# Patient Record
Sex: MALE | Race: WHITE | NOT HISPANIC OR LATINO | Employment: FULL TIME | URBAN - NONMETROPOLITAN AREA
[De-identification: names, ages, dates, MRNs, and addresses within clinical notes are randomized per-mention and may not be internally consistent; named-entity substitution may affect disease eponyms.]

---

## 2017-04-26 ENCOUNTER — HOSPITAL ENCOUNTER (OUTPATIENT)
Dept: RADIOLOGY | Facility: CLINIC | Age: 22
Discharge: HOME | End: 2017-04-26
Admitting: FAMILY MEDICINE
Payer: COMMERCIAL

## 2017-04-26 ENCOUNTER — OFFICE VISIT (OUTPATIENT)
Dept: URGENT CARE | Facility: CLINIC | Age: 22
End: 2017-04-26
Payer: COMMERCIAL

## 2017-04-26 DIAGNOSIS — M25.512 PAIN IN LEFT SHOULDER: ICD-10-CM

## 2017-04-26 PROCEDURE — 73030 X-RAY EXAM OF SHOULDER: CPT

## 2017-04-26 PROCEDURE — G0382 LEV 3 HOSP TYPE B ED VISIT: HCPCS

## 2017-04-26 PROCEDURE — 99283 EMERGENCY DEPT VISIT LOW MDM: CPT

## 2018-05-17 ENCOUNTER — OFFICE VISIT (OUTPATIENT)
Dept: URGENT CARE | Facility: CLINIC | Age: 23
End: 2018-05-17
Payer: COMMERCIAL

## 2018-05-17 VITALS
HEIGHT: 71 IN | DIASTOLIC BLOOD PRESSURE: 72 MMHG | WEIGHT: 230 LBS | OXYGEN SATURATION: 97 % | BODY MASS INDEX: 32.2 KG/M2 | HEART RATE: 73 BPM | TEMPERATURE: 98.6 F | SYSTOLIC BLOOD PRESSURE: 148 MMHG

## 2018-05-17 DIAGNOSIS — S46.912A STRAIN OF LEFT SHOULDER, INITIAL ENCOUNTER: Primary | ICD-10-CM

## 2018-05-17 PROCEDURE — 99213 OFFICE O/P EST LOW 20 MIN: CPT | Performed by: PHYSICIAN ASSISTANT

## 2018-05-17 RX ORDER — NAPROXEN 500 MG/1
500 TABLET ORAL 2 TIMES DAILY WITH MEALS
Qty: 14 TABLET | Refills: 0 | Status: SHIPPED | OUTPATIENT
Start: 2018-05-17 | End: 2021-08-08

## 2018-05-17 RX ORDER — BACLOFEN 10 MG/1
TABLET ORAL
Qty: 14 TABLET | Refills: 0 | Status: SHIPPED | OUTPATIENT
Start: 2018-05-17 | End: 2019-02-18

## 2018-05-17 NOTE — PROGRESS NOTES
[address] [employer] [address] [facility] [place]  (office) [phone]  (fax) [phone]        NAME: Julieth Fabian is a 20 y o  male  : 1995    MRN: [id number]  DATE: May 17, 2018  TIME: 3:02 PM    Assessment and Plan   Strain of left shoulder, initial encounter [L30 640A]  1  Strain of left shoulder, initial encounter  naproxen (NAPROSYN) 500 mg tablet    baclofen 10 mg tablet       Patient Instructions   To take medication as prescribed with food  Not to take baclofen while driving  Discussed with patient  To follow up with PCP or ortho is 3-5 days if no improvement in symptoms  To present to the ER if symptoms worsen  Chief Complaint     Chief Complaint   Patient presents with    Back Pain     SHOULDER BLADE 2 DAYS    Shoulder Pain         History of Present Illness   Julieth Fabian presents to the clinic c/o    Patient left handed  Shoulder Pain    The pain is present in the left shoulder  This is a recurrent problem  The current episode started in the past 7 days  There has been no history of extremity trauma  The problem occurs constantly  The problem has been unchanged  The quality of the pain is described as aching  The pain is at a severity of 6/10  The pain is moderate  Associated symptoms include numbness (reports occasional numbness from left elbow to fingers)  Pertinent negatives include no fever, inability to bear weight, itching, joint locking, joint swelling, limited range of motion, stiffness or tingling  The symptoms are aggravated by activity  He has tried nothing for the symptoms  The treatment provided no relief  Review of Systems   Review of Systems   Constitutional: Negative for activity change, appetite change, chills, diaphoresis, fatigue and fever  HENT: Negative for congestion, ear discharge, ear pain, facial swelling, rhinorrhea, sinus pain, sinus pressure, sneezing and sore throat      Eyes: Negative for photophobia, pain, discharge, redness, itching and visual disturbance  Respiratory: Negative for apnea, cough, chest tightness, shortness of breath and wheezing  Cardiovascular: Negative for chest pain  Gastrointestinal: Negative for abdominal distention, abdominal pain, anal bleeding, blood in stool, constipation, diarrhea, nausea and vomiting  Genitourinary: Negative for dysuria, flank pain, frequency, hematuria and urgency  Musculoskeletal: Positive for arthralgias  Negative for back pain, gait problem, joint swelling, myalgias, neck pain, neck stiffness and stiffness  Skin: Negative for color change, itching, rash and wound  Allergic/Immunologic: Negative for immunocompromised state  Neurological: Positive for numbness (reports occasional numbness from left elbow to fingers)  Negative for dizziness, tingling, facial asymmetry and headaches  Hematological: Negative for adenopathy  Psychiatric/Behavioral: Negative for confusion and decreased concentration  Current Medications     Long-Term Prescriptions   Medication Sig Dispense Refill    baclofen 10 mg tablet Take 1/2-1 tablet bid prn NOT to take while driving 14 tablet 0    naproxen (NAPROSYN) 500 mg tablet Take 1 tablet (500 mg total) by mouth 2 (two) times a day with meals for 7 days 14 tablet 0       Current Allergies     Allergies as of 05/17/2018    (No Known Allergies)            The following portions of the patient's history were reviewed and updated as appropriate: allergies, current medications, past family history, past medical history, past social history, past surgical history and problem list   History reviewed  No pertinent past medical history  History reviewed  No pertinent surgical history  Social History     Social History    Marital status: Single     Spouse name: N/A    Number of children: N/A    Years of education: N/A     Occupational History    Not on file       Social History Main Topics    Smoking status: Former Smoker     Packs/day: 1 00     Types: Cigarettes    Smokeless tobacco: Never Used    Alcohol use No    Drug use: No    Sexual activity: Not on file     Other Topics Concern    Not on file     Social History Narrative    No narrative on file       Objective   /72 (BP Location: Right arm, Patient Position: Sitting, Cuff Size: Standard)   Pulse 73   Temp 98 6 °F (37 °C) (Tympanic)   Ht 5' 11" (1 803 m)   Wt 104 kg (230 lb)   SpO2 97%   BMI 32 08 kg/m²      Physical Exam     Physical Exam   Constitutional: He is oriented to person, place, and time  He appears well-developed and well-nourished  No distress  HENT:   Head: Normocephalic and atraumatic  Right Ear: Tympanic membrane and external ear normal    Left Ear: Tympanic membrane and external ear normal    Nose: Nose normal    Mouth/Throat: Oropharynx is clear and moist  No oropharyngeal exudate  Eyes: Conjunctivae and EOM are normal  Pupils are equal, round, and reactive to light  Right eye exhibits no discharge  Left eye exhibits no discharge  No scleral icterus  Neck: Normal range of motion  Neck supple  No JVD present  No tracheal deviation present  No thyromegaly present  Cardiovascular: Normal rate, regular rhythm and normal heart sounds  Exam reveals no gallop and no friction rub  No murmur heard  Pulmonary/Chest: Effort normal and breath sounds normal  No stridor  No respiratory distress  He has no wheezes  He has no rales  He exhibits no tenderness  Abdominal: Soft  Bowel sounds are normal  He exhibits no distension and no mass  There is no tenderness  There is no rebound and no guarding  Musculoskeletal: Normal range of motion  He exhibits tenderness  He exhibits no deformity  Right shoulder: He exhibits normal range of motion, no tenderness, no swelling, no effusion, no crepitus, no deformity, no laceration, no spasm, normal pulse and normal strength  Left shoulder: He exhibits tenderness (scapula), spasm and abnormal pulse   He exhibits normal range of motion, no swelling, no effusion, no crepitus, no deformity, no laceration and normal strength  (-) Liz test   Lymphadenopathy:     He has no cervical adenopathy  Neurological: He is alert and oriented to person, place, and time  He has normal reflexes  Coordination normal    Skin: Skin is warm and dry  No rash noted  He is not diaphoretic  No erythema  No pallor  Psychiatric: He has a normal mood and affect  His behavior is normal  Judgment and thought content normal    Nursing note and vitals reviewed        Candace Herrera PA-C

## 2019-02-18 ENCOUNTER — OFFICE VISIT (OUTPATIENT)
Dept: URGENT CARE | Facility: CLINIC | Age: 24
End: 2019-02-18
Payer: COMMERCIAL

## 2019-02-18 ENCOUNTER — APPOINTMENT (OUTPATIENT)
Dept: RADIOLOGY | Facility: CLINIC | Age: 24
End: 2019-02-18
Payer: COMMERCIAL

## 2019-02-18 VITALS
DIASTOLIC BLOOD PRESSURE: 75 MMHG | WEIGHT: 215 LBS | SYSTOLIC BLOOD PRESSURE: 144 MMHG | BODY MASS INDEX: 30.1 KG/M2 | RESPIRATION RATE: 18 BRPM | HEART RATE: 90 BPM | TEMPERATURE: 98.2 F | OXYGEN SATURATION: 98 % | HEIGHT: 71 IN

## 2019-02-18 DIAGNOSIS — J18.9 PNEUMONIA OF RIGHT LOWER LOBE DUE TO INFECTIOUS ORGANISM: Primary | ICD-10-CM

## 2019-02-18 DIAGNOSIS — H61.23 BILATERAL IMPACTED CERUMEN: ICD-10-CM

## 2019-02-18 DIAGNOSIS — R05.9 COUGH: ICD-10-CM

## 2019-02-18 PROCEDURE — 99213 OFFICE O/P EST LOW 20 MIN: CPT | Performed by: NURSE PRACTITIONER

## 2019-02-18 PROCEDURE — 71046 X-RAY EXAM CHEST 2 VIEWS: CPT

## 2019-02-18 RX ORDER — DOXYCYCLINE HYCLATE 100 MG
100 TABLET ORAL 2 TIMES DAILY
Qty: 20 TABLET | Refills: 0 | Status: SHIPPED | OUTPATIENT
Start: 2019-02-18 | End: 2019-02-28

## 2019-02-18 NOTE — PROGRESS NOTES
[facility]        NAME: Marquis Garcia is a 24 y o  male  : 1995    MRN: [id number]  DATE: 2019  TIME: 9:52 AM    Assessment and Plan   Pneumonia of right lower lobe due to infectious organism ([facility] [address] ) [J18 1]  1  Pneumonia of right lower lobe due to infectious organism ([address] )  doxycycline hyclate (VIBRA-TABS) 100 mg tablet   2  Cough  XR chest pa & lateral    doxycycline hyclate (VIBRA-TABS) 100 mg tablet   3  Bilateral impacted cerumen           Patient Instructions       Follow up with PCP in 3-5 days  Proceed to  ER if symptoms worsen  You appear to have right lower lobe pneumonia  You are being treated with doxycyline and you must take all the medication  You are to take symptomatic relief like cough medication, dayquil, nyquil  Increase water  Stop smoking      Chief Complaint     Chief Complaint   Patient presents with    Cold Like Symptoms     cough, snezzing, nasal congestion x 2 weeks          History of Present Illness       This is a 24year old male who smokes and states has had a cough, sneezing, nasal congestion x 3 weeks  He states that he wakes up in the morning and has green chunks he is coughing up  Denies taking anything for his symptoms  + chills and nausea  Denies fevers, vomiting diarrhea  Review of Systems   Review of Systems   Constitutional: Positive for chills  HENT: Positive for congestion and sneezing  Negative for sinus pressure and trouble swallowing  Eyes: Negative  Respiratory: Positive for cough  Cardiovascular: Negative  Gastrointestinal: Negative  Endocrine: Negative  Genitourinary: Negative  Musculoskeletal: Negative  Skin: Negative  Allergic/Immunologic: Negative  Neurological: Negative  Hematological: Negative  Psychiatric/Behavioral: Negative            Current Medications       Current Outpatient Medications:     doxycycline hyclate (VIBRA-TABS) 100 mg tablet, Take 1 tablet (100 mg total) by mouth 2 (two) times a day for 10 days, Disp: 20 tablet, Rfl: 0    naproxen (NAPROSYN) 500 mg tablet, Take 1 tablet (500 mg total) by mouth 2 (two) times a day with meals for 7 days, Disp: 14 tablet, Rfl: 0    Current Allergies     Allergies as of 02/18/2019    (No Known Allergies)            The following portions of the patient's history were reviewed and updated as appropriate: allergies, current medications, past family history, past medical history, past social history, past surgical history and problem list      History reviewed  No pertinent past medical history  History reviewed  No pertinent surgical history  Family History   Problem Relation Age of Onset    No Known Problems Mother     No Known Problems Father          Medications have been verified  Objective   /75 (BP Location: Right arm, Patient Position: Sitting, Cuff Size: Standard)   Pulse 90   Temp 98 2 °F (36 8 °C)   Resp 18   Ht 5' 11" (1 803 m)   Wt 97 5 kg (215 lb)   SpO2 98%   BMI 29 99 kg/m²        Physical Exam     Physical Exam   Constitutional: He is oriented to person, place, and time  He appears well-developed and well-nourished  HENT:   Head: Normocephalic and atraumatic  Nose: Nose normal    Mouth/Throat: No oropharyngeal exudate  B/L Cerumen impaction   + oropharyngeal injection    Eyes: Pupils are equal, round, and reactive to light  EOM are normal    Neck: Normal range of motion  Neck supple  Cardiovascular: Normal rate, regular rhythm and normal heart sounds  Pulmonary/Chest: Effort normal    Decreased breath sounds through out    Abdominal: Soft  Bowel sounds are normal  He exhibits no distension  There is no tenderness  Musculoskeletal: Normal range of motion  Neurological: He is alert and oriented to person, place, and time  Skin: Skin is warm and dry  Capillary refill takes less than 2 seconds  He is not diaphoretic  No erythema  Psychiatric: He has a normal mood and affect   His behavior is normal  Judgment and thought content normal    Nursing note and vitals reviewed  Preliminary CXR reading  ?  Infiltrate RLL  Waiting on rad read

## 2019-02-18 NOTE — PATIENT INSTRUCTIONS
You appear to have right lower lobe pneumonia  You are being treated with doxycyline and you must take all the medication  You are to take symptomatic relief like cough medication, dayquil, nyquil  Increase water  Stop smoking  Follow up with your PCP  Go to ED if symptoms worsen     Acute Cough   WHAT YOU NEED TO KNOW:   An acute cough can last up to 3 weeks  Common causes of an acute cough include a cold, allergies, or a lung infection  DISCHARGE INSTRUCTIONS:   Return to the emergency department if:   · You have trouble breathing or feel short of breath  · You cough up blood, or you see blood in your mucus  · You faint or feel weak or dizzy  · You have chest pain when you cough or take a deep breath  · You have new wheezing  Contact your healthcare provider if:   · You have a fever  · Your cough lasts longer than 4 weeks  · Your symptoms do not improve with treatment  · You have questions or concerns about your condition or care  Medicines:   · Medicines  may be needed to stop the cough, decrease swelling in your airways, or help open your airways  Medicine may also be given to help you cough up mucus  Ask your healthcare provider what over-the-counter medicines you can take  If you have an infection caused by bacteria, you may need antibiotics  · Take your medicine as directed  Contact your healthcare provider if you think your medicine is not helping or if you have side effects  Tell him or her if you are allergic to any medicine  Keep a list of the medicines, vitamins, and herbs you take  Include the amounts, and when and why you take them  Bring the list or the pill bottles to follow-up visits  Carry your medicine list with you in case of an emergency  Manage your symptoms:   · Do not smoke and stay away from others who smoke  Nicotine and other chemicals in cigarettes and cigars can cause lung damage and make your cough worse   Ask your healthcare provider for information if you currently smoke and need help to quit  E-cigarettes or smokeless tobacco still contain nicotine  Talk to your healthcare provider before you use these products  · Drink extra liquids as directed  Liquids will help thin and loosen mucus so you can cough it up  Liquids will also help prevent dehydration  Examples of good liquids to drink include water, fruit juice, and broth  Do not drink liquids that contain caffeine  Caffeine can increase your risk for dehydration  Ask your healthcare provider how much liquid to drink each day  · Rest as directed  Do not do activities that make your cough worse, such as exercise  · Use a humidifier or vaporizer  Use a cool mist humidifier or a vaporizer to increase air moisture in your home  This may make it easier for you to breathe and help decrease your cough  · Eat 2 to 5 mL of honey 2 times each day  Honey can help thin mucus and decrease your cough  · Use cough drops or lozenges  These can help decrease throat irritation and your cough  Follow up with your healthcare provider as directed:  Write down your questions so you remember to ask them during your visits  © 2017 [address] [facility] Information is for End User's use only and may not be sold, redistributed or otherwise used for commercial purposes  All illustrations and images included in CareNotes® are the copyrighted property of A [employer] M [employer]  or [employer]  The above information is an  only  It is not intended as medical advice for individual conditions or treatments  Talk to your doctor, nurse or pharmacist before following any medical regimen to see if it is safe and effective for you  How to Quit Using Smokeless Tobacco   WHAT YOU NEED TO KNOW:   Smokeless tobacco comes in many forms  Examples include chew, snuff, dip, dissolvable tobacco, and snus   All smokeless tobacco products contain nicotine and may contain as much nicotine as 3 cigarettes  You may be physically dependent on nicotine  You may also be emotionally addicted to it  The cravings can be strong, but it is important to quit using smokeless tobacco  You will improve your health and decrease your cancer, stroke, and heart attack risk  Mouth sores and tooth problems will also improve when you quit  You can benefit from quitting no matter how long you have used smokeless tobacco    DISCHARGE INSTRUCTIONS:   Prepare to stop using smokeless tobacco:  Nicotine is a highly addictive drug  Withdrawal symptoms can happen when you stop and make it hard to quit  The following can help keep you on track:  · Set a quit date  If possible, set the date about 3 to 4 weeks in the future  This will help you prepare your home and routine for the change  Do not set the date too far away  You might change your mind or lose your resolve to quit  Know the triggers that tempt you, and make a plan to avoid them  · Tell friends, family, and coworkers that you plan to quit  Explain that you may have withdrawal symptoms when you quit  Ask them to support you  They may be able to encourage you and help reduce your stress to make it easier for you to quit  Ask them not to use any tobacco products around you  Do not allow them to use tobacco products in your home or car  · Remove all smokeless tobacco products from your home, car, and workplace  Remove anything else that will tempt you  Tools that can help you quit:   · Counseling  from a healthcare provider can provide you with support and skills to quit  The counselor can also teach you to manage your withdrawal symptoms and cravings  He may help you learn methods such as meditation that can help you feel less anxious or jittery  You may receive counseling from one counselor, in group therapy, or through phone therapy called a quit line  · Taper down  means you use less smokeless tobacco each day until your body no longer craves the nicotine   You can also reduce the number of places you use it or use a different kind that has less nicotine  Wait as long as possible before you use smokeless tobacco when you have a craving  You may be able to increase the amount of time you can wait after each craving  This will help decrease the amount you use and the number of cravings each day  · Nicotine replacement therapy (NRT)  such as patches, gum, or lozenges may help reduce your nicotine cravings and withdrawal symptoms  NRT is available without a doctor's order  Follow directions so you do not get too much nicotine  An overdose can be life-threatening  Do not switch to cigarettes as a way to quit using smokeless tobacco  If you are pregnant or have heart disease, talk to your healthcare provider before you start NRT  He may recommend other ways to quit, or he may want you to come in for follow-up visits while you use NRT  · Prescription medicines  such as nasal sprays or nicotine inhalers may help reduce your withdrawal symptoms  Ask your healthcare provider about these and other medicines to help reduce cravings  You may need to start certain medicines 2 weeks before your quit date for them to work well  · Chew sugarless gum or sunflower seeds  as a substitute for smokeless tobacco   Manage weight gain after you quit:  Nicotine can affect your metabolism  You may gain a few pounds after you quit  The following can help you control your weight:  · Eat healthy foods  Healthy foods include fruits, vegetables, whole-grain breads, low-fat dairy products, beans, lean meats, and fish  You may also find it helpful to chew sugarless gum or eat healthy snacks  · Drink water before, during, and between meals  This will make your stomach feel full and help prevent you from overeating  Ask your healthcare provider how much liquid to drink each day and which liquids are best for you  · Exercise as directed    Exercise may help reduce cravings and stress from nicotine withdrawal  Take a walk or do some kind of exercise every day  © 2017 [address] Information is for End User's use only and may not be sold, redistributed or otherwise used for commercial purposes  All illustrations and images included in CareNotes® are the copyrighted property of A D [employer] M [employer] Inc  or [employer]  The above information is an  only  It is not intended as medical advice for individual conditions or treatments  Talk to your doctor, nurse or pharmacist before following any medical regimen to see if it is safe and effective for you  Pneumonia   WHAT YOU NEED TO KNOW:   Pneumonia is an infection in your lungs caused by bacteria, viruses, fungi, or parasites  You can become infected if you come in contact with someone who is sick  You can get pneumonia if you recently had surgery or needed a ventilator to help you breathe  Pneumonia can also be caused by accidentally inhaling saliva or small pieces of food  Pneumonia may cause mild symptoms, or it can be severe and life-threatening  DISCHARGE INSTRUCTIONS:   Return to the emergency department if:   · You cough up blood  · Your heart beats more than 100 beats in 1 minute  · You are very tired, confused, and cannot think clearly  · You have chest pain or trouble breathing  · Your lips or fingernails turn gray or blue  Contact your healthcare provider if:   · Your symptoms are the same or get worse 48 hours after you start antibiotics  · Your fever is not below 99°F (37 2°C) 48 hours after you start antibiotics  · You have a fever higher than 101°F (38 3°C)  · You cannot eat, or you have loss of appetite, nausea, or are vomiting  · You have questions or concerns about your condition or care  Medicines:   · Antibiotics  treat pneumonia caused by bacteria  · Acetaminophen  decreases pain and fever  It is available without a doctor's order   Ask how much to take and how often to take it  Follow directions  Read the labels of all other medicines you are using to see if they also contain acetaminophen, or ask your doctor or pharmacist  Acetaminophen can cause liver damage if not taken correctly  Do not use more than 4 grams (4,000 milligrams) total of acetaminophen in one day  · NSAIDs , such as ibuprofen, help decrease swelling, pain, and fever  This medicine is available with or without a doctor's order  NSAIDs can cause stomach bleeding or kidney problems in certain people  If you take blood thinner medicine, always ask your healthcare provider if NSAIDs are safe for you  Always read the medicine label and follow directions  · Take your medicine as directed  Contact your healthcare provider if you think your medicine is not helping or if you have side effects  Tell him or her if you are allergic to any medicine  Keep a list of the medicines, vitamins, and herbs you take  Include the amounts, and when and why you take them  Bring the list or the pill bottles to follow-up visits  Carry your medicine list with you in case of an emergency  Follow up with your healthcare provider as directed: You will need to return for more tests  Write down your questions so you remember to ask them during your visits  Manage your symptoms:   · Rest as needed  Rest often throughout the day  Alternate times of activity with times of rest     · Drink liquids as directed  Ask how much liquid to drink each day and which liquids are best for you  Liquids help thin your mucus, which may make it easier for you to cough it up  · Do not smoke  Avoid secondhand smoke  Smoking increases your risk for pneumonia  Smoking also makes it harder for you to get better after you have had pneumonia  Ask your healthcare provider for information if you need help to quit smoking  · Use a cool mist humidifier  A humidifier will help increase air moisture in your home   This may make it easier for you to breathe and help decrease your cough  · Keep your head elevated  You may be able to breathe better if you lie down with the head of your bed up  Prevent pneumonia:   · Prevent the spread of germs  Wash your hands often with soap and water  Use gel hand cleanser when there is no soap and water available  Do not touch your eyes, nose, or mouth unless you have washed your hands first  Cover your mouth when you cough  Cough into a tissue or your shirtsleeve so you do not spread germs from your hands  If you are sick, stay away from others as much as possible  · Limit alcohol  Women should limit alcohol to 1 drink a day  Men should limit alcohol to 2 drinks a day  A drink of alcohol is 12 ounces of beer, 5 ounces of wine, or 1½ ounces of liquor  · Ask about vaccines  You may need a vaccine to help prevent pneumonia  Get an influenza (flu) vaccine every year as soon as it becomes available  © 2017 [address]  Information is for End User's use only and may not be sold, redistributed or otherwise used for commercial purposes  All illustrations and images included in CareNotes® are the copyrighted property of [employer]  or [address]  The above information is an  only  It is not intended as medical advice for individual conditions or treatments  Talk to your doctor, nurse or pharmacist before following any medical regimen to see if it is safe and effective for you

## 2019-02-18 NOTE — LETTER
February 18, 2019     Patient: Henry Parisi   YOB: 1995   Date of Visit: 2/18/2019       To Whom It May Concern: It is my medical opinion that Henry Parisi may return to work on 2/19/19   If you have any questions or concerns, please don't hesitate to call           Sincerely,        JENNA Swanson    CC: Henry Stanleymon

## 2021-08-08 ENCOUNTER — HOSPITAL ENCOUNTER (EMERGENCY)
Facility: HOSPITAL | Age: 26
Discharge: HOME | End: 2021-08-08
Attending: EMERGENCY MEDICINE
Payer: COMMERCIAL

## 2021-08-08 ENCOUNTER — APPOINTMENT (EMERGENCY)
Dept: CT IMAGING | Facility: HOSPITAL | Age: 26
End: 2021-08-08
Payer: COMMERCIAL

## 2021-08-08 VITALS
OXYGEN SATURATION: 98 % | BODY MASS INDEX: 31.09 KG/M2 | DIASTOLIC BLOOD PRESSURE: 73 MMHG | RESPIRATION RATE: 16 BRPM | HEART RATE: 63 BPM | SYSTOLIC BLOOD PRESSURE: 130 MMHG | WEIGHT: 222.88 LBS | TEMPERATURE: 97 F

## 2021-08-08 DIAGNOSIS — G43.009: Primary | ICD-10-CM

## 2021-08-08 LAB — SARS-COV-2 RNA RESP QL NAA+PROBE: NEGATIVE

## 2021-08-08 PROCEDURE — 96374 THER/PROPH/DIAG INJ IV PUSH: CPT

## 2021-08-08 PROCEDURE — 96361 HYDRATE IV INFUSION ADD-ON: CPT

## 2021-08-08 PROCEDURE — U0003 INFECTIOUS AGENT DETECTION BY NUCLEIC ACID (DNA OR RNA); SEVERE ACUTE RESPIRATORY SYNDROME CORONAVIRUS 2 (SARS-COV-2) (CORONAVIRUS DISEASE [COVID-19]), AMPLIFIED PROBE TECHNIQUE, MAKING USE OF HIGH THROUGHPUT TECHNOLOGIES AS DESCRIBED BY CMS-2020-01-R: HCPCS | Performed by: EMERGENCY MEDICINE

## 2021-08-08 PROCEDURE — 96376 TX/PRO/DX INJ SAME DRUG ADON: CPT

## 2021-08-08 PROCEDURE — 70450 CT HEAD/BRAIN W/O DYE: CPT

## 2021-08-08 PROCEDURE — 99284 EMERGENCY DEPT VISIT MOD MDM: CPT

## 2021-08-08 PROCEDURE — 96375 TX/PRO/DX INJ NEW DRUG ADDON: CPT

## 2021-08-08 PROCEDURE — 99284 EMERGENCY DEPT VISIT MOD MDM: CPT | Performed by: EMERGENCY MEDICINE

## 2021-08-08 PROCEDURE — U0005 INFEC AGEN DETEC AMPLI PROBE: HCPCS | Performed by: EMERGENCY MEDICINE

## 2021-08-08 RX ORDER — DIPHENHYDRAMINE HYDROCHLORIDE 50 MG/ML
25 INJECTION INTRAMUSCULAR; INTRAVENOUS ONCE
Status: COMPLETED | OUTPATIENT
Start: 2021-08-08 | End: 2021-08-08

## 2021-08-08 RX ORDER — ONDANSETRON 2 MG/ML
4 INJECTION INTRAMUSCULAR; INTRAVENOUS ONCE
Status: COMPLETED | OUTPATIENT
Start: 2021-08-08 | End: 2021-08-08

## 2021-08-08 RX ORDER — KETOROLAC TROMETHAMINE 30 MG/ML
15 INJECTION, SOLUTION INTRAMUSCULAR; INTRAVENOUS ONCE
Status: COMPLETED | OUTPATIENT
Start: 2021-08-08 | End: 2021-08-08

## 2021-08-08 RX ORDER — METOCLOPRAMIDE HYDROCHLORIDE 5 MG/ML
10 INJECTION INTRAMUSCULAR; INTRAVENOUS ONCE
Status: COMPLETED | OUTPATIENT
Start: 2021-08-08 | End: 2021-08-08

## 2021-08-08 RX ADMIN — KETOROLAC TROMETHAMINE 15 MG: 30 INJECTION, SOLUTION INTRAMUSCULAR at 06:24

## 2021-08-08 RX ADMIN — METOCLOPRAMIDE HYDROCHLORIDE 10 MG: 5 INJECTION INTRAMUSCULAR; INTRAVENOUS at 07:18

## 2021-08-08 RX ADMIN — SODIUM CHLORIDE 1000 ML: 0.9 INJECTION, SOLUTION INTRAVENOUS at 06:24

## 2021-08-08 RX ADMIN — ONDANSETRON 4 MG: 2 INJECTION INTRAMUSCULAR; INTRAVENOUS at 06:24

## 2021-08-08 RX ADMIN — KETOROLAC TROMETHAMINE 15 MG: 30 INJECTION, SOLUTION INTRAMUSCULAR at 07:19

## 2021-08-08 RX ADMIN — DIPHENHYDRAMINE HYDROCHLORIDE 25 MG: 50 INJECTION, SOLUTION INTRAMUSCULAR; INTRAVENOUS at 07:18

## 2021-08-08 NOTE — ED PROVIDER NOTES
History  Chief Complaint   Patient presents with    Headache     HA that started around 2100 while at race [employer]  denies n/v   reports minimal photophobia and phonophobia  Patient is a 50-year-old otherwise healthy male presenting to the emergency department today complaining headache, headache started around 9:00 p m  Yesterday while he was working at the local race [place], states it is on the left side of his head and throbbing in nature, he took ibuprofen twice throughout the night with no relief of symptoms, he woke up this morning with a headache throbbing once again, it is worsened when he leans forward, he reports some associated photophobia but no phonophobia, no nausea or vomiting, no fever or chills, no head injury, patient denies history of headaches previously          None       History reviewed  No pertinent past medical history  History reviewed  No pertinent surgical history  Family History   Problem Relation Age of Onset    No Known Problems Mother     No Known Problems Father      I have reviewed and agree with the history as documented  E-Cigarette/Vaping    E-Cigarette Use Never User      E-Cigarette/Vaping Substances    Nicotine No     THC No     CBD No     Flavoring No     Other No     Unknown No      Social History     Tobacco Use    Smoking status: Current Every Day Smoker     Packs/day: 1 00     Types: Cigarettes    Smokeless tobacco: Never Used   Vaping Use    Vaping Use: Never used   Substance Use Topics    Alcohol use: Yes    Drug use: Yes     Types: Marijuana       Review of Systems   Constitutional: Negative  HENT: Negative  Eyes: Negative  Respiratory: Negative  Cardiovascular: Negative  Gastrointestinal: Negative  Endocrine: Negative  Genitourinary: Negative  Musculoskeletal: Negative  Skin: Negative  Allergic/Immunologic: Negative  Neurological: Positive for headaches  Hematological: Negative      Psychiatric/Behavioral: Negative  Physical Exam  Physical Exam  Constitutional:       Appearance: He is well-developed  HENT:      Head: Normocephalic and atraumatic  Nose: Nose normal    Eyes:      Conjunctiva/sclera: Conjunctivae normal       Pupils: Pupils are equal, round, and reactive to light  Cardiovascular:      Rate and Rhythm: Normal rate  Pulmonary:      Effort: Pulmonary effort is normal    Abdominal:      Palpations: Abdomen is soft  Musculoskeletal:         General: Normal range of motion  Cervical back: Normal range of motion and neck supple  Skin:     General: Skin is warm and dry  Neurological:      Mental Status: He is alert and oriented to person, place, and time           Vital Signs  ED Triage Vitals   Temperature Pulse Respirations Blood Pressure SpO2   08/08/21 0608 08/08/21 0608 08/08/21 0608 08/08/21 0608 08/08/21 0608   (!) 97 °F (36 1 °C) 81 16 154/98 100 %      Temp Source Heart Rate Source Patient Position - Orthostatic VS BP Location FiO2 (%)   08/08/21 0608 08/08/21 0700 08/08/21 0608 08/08/21 0608 --   Temporal Monitor Lying Right arm       Pain Score       08/08/21 0607       9           Vitals:    08/08/21 0608 08/08/21 0700 08/08/21 0730 08/08/21 0745   BP: 154/98 127/70 116/66 130/73   Pulse: 81 60 77 63   Patient Position - Orthostatic VS: Lying Lying Lying Lying         Visual Acuity  Visual Acuity      Most Recent Value   L Pupil Size (mm)  3   R Pupil Size (mm)  3          ED Medications  Medications   sodium chloride 0 9 % bolus 1,000 mL (0 mL Intravenous Stopped 8/8/21 0713)   ketorolac (TORADOL) injection 15 mg (15 mg Intravenous Given 8/8/21 0624)   ondansetron (ZOFRAN) injection 4 mg (4 mg Intravenous Given 8/8/21 0624)   ketorolac (TORADOL) injection 15 mg (15 mg Intravenous Given 8/8/21 0719)   diphenhydrAMINE (BENADRYL) injection 25 mg (25 mg Intravenous Given 8/8/21 0718)   metoclopramide (REGLAN) injection 10 mg (10 mg Intravenous Given 8/8/21 0718) Diagnostic Studies  Results Reviewed     Procedure Component Value Units Date/Time    Novel Coronavirus [facility] - 2 Hour Stat [[id number]]  (Normal) Collected: 08/08/21 0619    Lab Status: Final result Specimen: Nares from Nose Updated: 08/08/21 0740     SARS-CoV-2 Negative    Narrative: The specimen collection materials, transport medium, and/or testing methodology utilized in the production of these test results have been proven to be reliable in a limited validation with an abbreviated program under the Emergency Utilization Authorization provided by the FDA  Testing reported as "Presumptive positive" will be confirmed with secondary testing to ensure result accuracy  Clinical caution and judgement should be used with the interpretation of these results with consideration of the clinical impression and other laboratory testing  Testing reported as "Positive" or "Negative" has been proven to be accurate according to standard laboratory validation requirements  All testing is performed with control materials showing appropriate reactivity at standard intervals  CT head without contrast   Final Result by Christ Dudley MD (08/08 1155)      No acute intracranial hemorrhage, mass effect or edema  Workstation performed: [id number]                         ED Course  ED Course as of Aug 08 1905   Ulice Ranks Aug 08, 2021   0700 Endorsed to Dr Shine Costello pending CT results, re-eval and dispo                                SBIRT 22yo+      Most Recent Value   SBIRT (23 yo +)   In order to provide better care to our patients, we are screening all of our patients for alcohol and drug use  Would it be okay to ask you these screening questions? No Filed at: 08/08/2021 7114   Initial Alcohol Screen: US AUDIT-C    3b  FEMALE Any Age, or MALE 65+: How often do you have 4 or more drinks on one occassion?   0 Filed at: 08/08/2021 0615   Audit-C Score  0 Filed at: 08/08/2021 6435 SYDNEY: How many times in the past year have you    Used an illegal drug or used a prescription medication for non-medical reasons? Monthly Filed at: 08/08/2021 0615   DAST-10: In the past 12 months      1  Have you used drugs other than those required for medical reasons? 1 Filed at: 08/08/2021 0615   2  Do you use more than one drug at a time? 0 Filed at: 08/08/2021 0615   3  Have you had medical problems as a result of your drug use (e g , memory loss, hepatitis, convulsions, bleeding, etc )? 0 Filed at: 08/08/2021 0615   4  Have you had "blackouts" or "flashbacks" as a result of drug use? YesNo  0 Filed at: 08/08/2021 0615   5  Do you ever feel bad or guilty about your drug use? 0 Filed at: 08/08/2021 0615   6  Does your spouse (or parent) ever complain about your involvement with drugs? 0 Filed at: 08/08/2021 0615   7  Have you neglected your family because of your use of drugs? 0 Filed at: 08/08/2021 0615   8  Have you engaged in illegal activities in order to obtain drugs? 0 Filed at: 08/08/2021 0615   9  Have you ever experienced withdrawal symptoms (felt sick) when you stopped taking drugs? 0 Filed at: 08/08/2021 0615   10  Are you always able to stop using drugs when you want to?  0 Filed at: 08/08/2021 0615   DAST-10 Score  1 Filed at: 08/08/2021 7438                    Disposition  Final diagnoses:   Migrainous headache without aura     Time reflects when diagnosis was documented in both MDM as applicable and the Disposition within this note     Time User Action Codes Description Comment    8/8/2021  7:43 AM Jb Bowser Add [G43 009] Migrainous headache without aura       ED Disposition     ED Disposition Condition Date/Time Comment    Discharge Stable Sun Aug 8, 2021  7:43 AM Jovanny Fitzgerald discharge to home/self care              Follow-up Information     Follow up With Specialties Details Why Contact Info    Alin Yeh,  Family Medicine Schedule an appointment as soon as possible for a visit in 1 week  [facility]  [phone]            There are no discharge medications for this patient  No discharge procedures on file      PDMP Review     None          ED Provider  Electronically Signed by           Dinorah Carlson DO  08/08/21 8204

## 2021-08-08 NOTE — DISCHARGE INSTRUCTIONS
Headache, probably migrainous  Return immediately if worse or any new symptoms  Tylenol 1000 mg every 6 hours as needed  and/or  Advil 400 mg every 6 hours as needed  May take both together

## 2021-08-08 NOTE — Clinical Note
Levi Lidiaeric was seen and treated in our emergency department on 8/8/2021  Diagnosis:     Irina Do  may return to work on return date  He may return on this date: 08/11/2021         If you have any questions or concerns, please don't hesitate to call        Aditya Cross, DO    ______________________________           _______________          _______________  Hospital Representative                              Date                                Time

## 2021-08-08 NOTE — ED CARE HANDOFF
Emergency Department Sign Out Note        Sign out and transfer  See Separate Emergency Department note  The patient, Brandon Wright, was evaluated by the previous provider    Workup Completed:  CT head     ED Course / Workup Pending (followup): Medicated/hydrating, CT results pending                                  ED Course as of Aug 08 0748   Coolidge Gosselin Aug 08, 2021   0719 Notes headache has improved, still moderate to severe, discussed CT scan findings normal  Appears comfortable, conversational, moves with ease  No history of fever or injury or rash changes, no joint effusions, did have shoulder joint strain cared for by chiropractor  Agreeable with additional medications, observation      0740 SARS-COV-2: Negative   0740 Notes markedly improved, would like to be discharged, mother present  We discussed results, outpatient follow-up going forward including seeing family physician, discuss further testing and consultation as needed, rest and hydration, provide work note        Procedures  MDM    Disposition  Final diagnoses:   Migrainous headache without aura     Time reflects when diagnosis was documented in both MDM as applicable and the Disposition within this note     Time User Action Codes Description Comment    8/8/2021  7:43 AM Stahl Hint Add [G43 009] Migrainous headache without aura       ED Disposition     ED Disposition Condition Date/Time Comment    Discharge Stable Sun Aug 8, 2021  7:43 AM Brandon Wright discharge to home/self care  Follow-up Information     Follow up With Specialties Details Why [address], DO Family Medicine Schedule an appointment as soon as possible for a visit in 1 week  [address]  [place] [phone]  [phone]          Patient's Medications   Discharge Prescriptions    No medications on file     No discharge procedures on file         ED Provider  Electronically Signed by     Joe Her DO  08/08/21 8849

## 2025-06-12 ENCOUNTER — APPOINTMENT (EMERGENCY)
Dept: RADIOLOGY | Facility: HOSPITAL | Age: 30
End: 2025-06-12
Payer: COMMERCIAL

## 2025-06-12 ENCOUNTER — HOSPITAL ENCOUNTER (EMERGENCY)
Facility: HOSPITAL | Age: 30
Discharge: HOME | End: 2025-06-12
Attending: EMERGENCY MEDICINE
Payer: COMMERCIAL

## 2025-06-12 VITALS
WEIGHT: 225.6 LBS | RESPIRATION RATE: 16 BRPM | SYSTOLIC BLOOD PRESSURE: 163 MMHG | HEART RATE: 74 BPM | OXYGEN SATURATION: 100 % | BODY MASS INDEX: 31.46 KG/M2 | TEMPERATURE: 98.2 F | DIASTOLIC BLOOD PRESSURE: 89 MMHG

## 2025-06-12 DIAGNOSIS — S46.811A TRAPEZIUS MUSCLE STRAIN, RIGHT, INITIAL ENCOUNTER: Primary | ICD-10-CM

## 2025-06-12 PROCEDURE — 99283 EMERGENCY DEPT VISIT LOW MDM: CPT

## 2025-06-12 PROCEDURE — 73030 X-RAY EXAM OF SHOULDER: CPT

## 2025-06-12 PROCEDURE — 72040 X-RAY EXAM NECK SPINE 2-3 VW: CPT

## 2025-06-12 PROCEDURE — 71046 X-RAY EXAM CHEST 2 VIEWS: CPT

## 2025-06-12 PROCEDURE — 99284 EMERGENCY DEPT VISIT MOD MDM: CPT | Performed by: EMERGENCY MEDICINE

## 2025-06-12 RX ORDER — PREDNISONE 20 MG/1
40 TABLET ORAL DAILY
Qty: 10 TABLET | Refills: 0 | Status: SHIPPED | OUTPATIENT
Start: 2025-06-12 | End: 2025-06-17

## 2025-06-12 RX ORDER — NAPROXEN 500 MG/1
500 TABLET ORAL 2 TIMES DAILY WITH MEALS
Qty: 30 TABLET | Refills: 0 | Status: SHIPPED | OUTPATIENT
Start: 2025-06-12

## 2025-06-12 RX ORDER — LIDOCAINE 50 MG/G
1 PATCH TOPICAL DAILY
Qty: 7 PATCH | Refills: 0 | Status: SHIPPED | OUTPATIENT
Start: 2025-06-12 | End: 2025-06-19

## 2025-06-12 NOTE — DISCHARGE INSTRUCTIONS
Patient Education     Muscle Strain Discharge Instructions   About this topic   A muscle strain is also known as a pulled muscle. A muscle strain happens when muscles are stretched too much or work too hard. It can also happen if muscles are stretched too quickly. Muscle strains can be minor or serious. The amount of time it takes to heal will depend on how bad your muscle strain is as well as your age and overall health.       What care is needed at home?   Ask your doctor what you need to do when you go home. Make sure you ask questions if you do not understand what the doctor says. This way you will know what you need to do.  Rest your muscle. If you can, prop it on pillows when you rest. Once you have less pain, slowly increase your activity level. If your muscle starts to hurt again, rest it.  Place an ice pack or a bag of frozen vegetables wrapped in a towel over the painful part. Never put ice right on the skin. Use ice every 1 to 2 hours for 10 to 15 minutes at a time. Use for the first 24 to 48 hours after your injury.  You may want to take medicines like acetaminophen, ibuprofen, or naproxen for swelling and pain.  What follow-up care is needed?   Your doctor may ask you to make visits to the office to check on your progress. Be sure to keep these visits.  What drugs may be needed?   The doctor may order drugs to:  Help with pain and swelling  Will physical activity be limited?   You may have to limit your activity. Talk to your doctor about the right amount of activity for you.  What can be done to prevent this health problem?   Warm-up before and cool-down after playing sports.  Drink lots of water during and after a workout.  Stretch your muscles on a regular basis.  Wear proper clothing or footwear when you are playing sports.  When do I need to call the doctor?   You are not able to move the injured muscle because of the pain.  The pain or swelling become worse.  You keep straining the same  muscle.  Teach Back: Helping You Understand   The Teach Back Method helps you understand the information we are giving you. After you talk with the staff, tell them in your own words what you learned. This helps to make sure the staff has described each thing clearly. It also helps to explain things that may have been confusing. Before going home, make sure you can do these:  I can tell you about my condition.  I can tell you what may help ease my pain.  I can tell you what I will do if I have more pain or swelling.  Last Reviewed Date   2021-06-18  Consumer Information Use and Disclaimer   This generalized information is a limited summary of diagnosis, treatment, and/or medication information. It is not meant to be comprehensive and should be used as a tool to help the user understand and/or assess potential diagnostic and treatment options. It does NOT include all information about conditions, treatments, medications, side effects, or risks that may apply to a specific patient. It is not intended to be medical advice or a substitute for the medical advice, diagnosis, or treatment of a health care provider based on the health care provider's examination and assessment of a patient’s specific and unique circumstances. Patients must speak with a health care provider for complete information about their health, medical questions, and treatment options, including any risks or benefits regarding use of medications. This information does not endorse any treatments or medications as safe, effective, or approved for treating a specific patient. [employer] and its affiliates disclaim any warranty or liability relating to this information or the use thereof. The use of this information is governed by the Terms of Use, available at https://www.wolterskluwer.com/en/know/clinical-effectiveness-terms   Copyright   Copyright © 2024 [employer] and its affiliates and/or licensors. All rights reserved.

## 2025-06-12 NOTE — ED PROVIDER NOTES
"Time reflects when diagnosis was documented in both MDM as applicable and the Disposition within this note       Time User Action Codes Description Comment    6/12/2025  5:20 PM Nitesh Starr Add [D38.629V] Trapezius muscle strain, right, initial encounter           ED Disposition       ED Disposition   Discharge    Condition   Stable    Date/Time   Thu Jun 12, 2025  5:20 PM    Comment   Kirk Mc discharge to home/self care.                   Assessment & Plan       Medical Decision Making  Amount and/or Complexity of Data Reviewed  Radiology: ordered and independent interpretation performed. Decision-making details documented in ED Course.    Risk  Prescription drug management.             Medications - No data to display    ED Risk Strat Scores                    No data recorded        SBIRT 20yo+      Flowsheet Row Most Recent Value   Initial Alcohol Screen: US AUDIT-C     1. How often do you have a drink containing alcohol? 0 Filed at: 06/12/2025 1643   2. How many drinks containing alcohol do you have on a typical day you are drinking?  0 Filed at: 06/12/2025 1643   3a. Male UNDER 65: How often do you have five or more drinks on one occasion? 0 Filed at: 06/12/2025 1643   Audit-C Score 0 Filed at: 06/12/2025 1643   SYDNEY: How many times in the past year have you...    Used an illegal drug or used a prescription medication for non-medical reasons? Once or Twice Filed at: 06/12/2025 1643   DAST-10: In the past 12 months...    1. Have you used drugs other than those required for medical reasons? 0 Filed at: 06/12/2025 1645   2. Do you use more than one drug at a time? 0 Filed at: 06/12/2025 1645   3. Have you had medical problems as a result of your drug use (e.g., memory loss, hepatitis, convulsions, bleeding, etc.)? 0 Filed at: 06/12/2025 1645   4. Have you had \"blackouts\" or \"flashbacks\" as a result of drug use?YesNo 0 Filed at: 06/12/2025 1645   5. Do you ever feel bad or guilty about your drug use? 0 " Filed at: 06/12/2025 1645   6. Does your spouse (or parent) ever complain about your involvement with drugs? 0 Filed at: 06/12/2025 1645   7. Have you neglected your family because of your use of drugs? 0 Filed at: 06/12/2025 1645   8. Have you engaged in illegal activities in order to obtain drugs? 0 Filed at: 06/12/2025 1645   9. Have you ever experienced withdrawal symptoms (felt sick) when you stopped taking drugs? 0 Filed at: 06/12/2025 1645   10. Are you always able to stop using drugs when you want to? 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  Denies taking anything for the pain.        Past Medical History[1]   Past Surgical History[2]   Family History[3]   Social History[4]   E-Cigarette/Vaping    E-Cigarette Use Never User       E-Cigarette/Vaping Substances    Nicotine No     THC No     CBD No     Flavoring No     Other No     Unknown No       I have reviewed and agree with the history as documented.     Patient complains of right shoulder/right upper back pain that has been ongoing for the past 1 week.  Does a lot of physical activity at work.  Nothing taken for pain.  Worse with certain movements.  No direct trauma.  No chest pain or shortness of breath.      History provided by:  Patient   used: No    Shoulder Pain  Location:  Shoulder  Shoulder location:  R shoulder  Injury: no    Pain details:     Quality:  Aching    Radiates to: Radiates to neck.    Severity:  Mild    Onset quality:  Gradual    Duration:  1 week    Timing:  Constant    Progression:  Unchanged  Prior injury to area:  No  Relieved by:  Nothing  Worsened by:  Movement  Ineffective treatments:  None tried  Associated symptoms: no back pain, no  decreased range of motion, no fever, no muscle weakness, no neck pain, no swelling and no tingling        Review of Systems   Constitutional:  Negative for chills and fever.   HENT:  Negative for ear pain, hearing loss, sore throat, trouble swallowing and voice change.    Eyes:  Negative for pain and discharge.   Respiratory:  Negative for cough, shortness of breath and wheezing.    Cardiovascular:  Negative for chest pain and palpitations.   Gastrointestinal:  Negative for abdominal pain, blood in stool, constipation, diarrhea, nausea and vomiting.   Genitourinary:  Negative for dysuria, flank pain, frequency and hematuria.   Musculoskeletal:  Positive for arthralgias and myalgias. Negative for back pain, joint swelling, neck pain and neck stiffness.   Skin:  Negative for rash and wound.   Neurological:  Negative for dizziness, seizures, syncope, facial asymmetry and headaches.   Psychiatric/Behavioral:  Negative for hallucinations, self-injury and suicidal ideas.    All other systems reviewed and are negative.          Objective       ED Triage Vitals   Temperature Pulse Blood Pressure Respirations SpO2 Patient Position - Orthostatic VS   06/12/25 1644 06/12/25 1641 06/12/25 1641 06/12/25 1641 06/12/25 1641 06/12/25 1641   98.2 °F (36.8 °C) 74 163/89 16 100 % Sitting      Temp Source Heart Rate Source BP Location FiO2 (%) Pain Score    06/12/25 1644 06/12/25 1641 06/12/25 1641 -- 06/12/25 1641    Temporal Monitor Left arm  7      Vitals      Date and Time Temp Pulse SpO2 Resp BP Pain Score FACES Pain Rating User   06/12/25 1644 98.2 °F (36.8 °C) -- -- -- -- -- -- MY   06/12/25 1641 -- 74 100 % 16 163/89 7 -- BF            Physical Exam  Constitutional:       General: He is not in acute distress.     Appearance: Normal appearance. He is not ill-appearing.   HENT:      Head: Normocephalic and atraumatic.      Right Ear: External ear normal.      Left Ear: External ear normal.      Nose: Nose normal.       Mouth/Throat:      Mouth: Mucous membranes are moist.     Eyes:      Extraocular Movements: Extraocular movements intact.      Pupils: Pupils are equal, round, and reactive to light.       Cardiovascular:      Rate and Rhythm: Normal rate and regular rhythm.   Pulmonary:      Effort: Pulmonary effort is normal. No respiratory distress.      Breath sounds: Normal breath sounds.   Abdominal:      General: Abdomen is flat. Bowel sounds are normal. There is no distension.      Palpations: Abdomen is soft.      Tenderness: There is no abdominal tenderness.     Musculoskeletal:         General: Tenderness present. No swelling.      Cervical back: Normal range of motion and neck supple.      Comments: Right shoulder with no obvious bony deformity.  Full range of motion.  Reproducible symptoms with raising the arm above the level of the shoulder and turning his head from side-to-side.  Tender along the right trapezius and right infrascapular region.     Skin:     General: Skin is warm and dry.      Capillary Refill: Capillary refill takes less than 2 seconds.     Neurological:      General: No focal deficit present.      Mental Status: He is alert and oriented to person, place, and time.     Psychiatric:         Mood and Affect: Mood normal.         Behavior: Behavior normal.         Results Reviewed       None            XR shoulder 2+ views RIGHT   ED Interpretation by Nitesh Starr MD (06/12 1717)   No fracture or dislocation      XR chest 2 views   ED Interpretation by Nitesh Starr MD (06/12 1717)   No acute disease      XR cervical spine 2 or 3 views   ED Interpretation by Nietsh Starr MD (06/12 1717)   No fracture          Procedures    ED Medication and Procedure Management   None     Patient's Medications   Discharge Prescriptions    LIDOCAINE (LIDODERM) 5 %    Apply 1 patch topically daily over 12 hours for 7 days Apply to area of pain.  Remove & Discard patch within 12 hours or as directed by MD        Start Date: 6/12/2025 End Date: 6/19/2025       Order Dose: 1 patch       Quantity: 7 patch    Refills: 0    NAPROXEN (NAPROSYN) 500 MG TABLET    Take 1 tablet (500 mg total) by mouth 2 (two) times a day with meals       Start Date: 6/12/2025 End Date: --       Order Dose: 500 mg       Quantity: 30 tablet    Refills: 0    PREDNISONE 20 MG TABLET    Take 2 tablets (40 mg total) by mouth daily for 5 days       Start Date: 6/12/2025 End Date: 6/17/2025       Order Dose: 40 mg       Quantity: 10 tablet    Refills: 0     No discharge procedures on file.  ED SEPSIS DOCUMENTATION   Time reflects when diagnosis was documented in both MDM as applicable and the Disposition within this note       Time User Action Codes Description Comment    6/12/2025  5:20 PM Nitesh Starr Add [S46.811A] Trapezius muscle strain, right, initial encounter                      [1] No past medical history on file.  [2] No past surgical history on file.  [3]   Family History  Problem Relation Name Age of Onset    No Known Problems Mother      No Known Problems Father     [4]   Social History  Tobacco Use    Smoking status: Every Day     Current packs/day: 1.00     Types: Cigarettes    Smokeless tobacco: Never   Vaping Use    Vaping status: Never Used   Substance Use Topics    Alcohol use: Yes    Drug use: Yes     Types: Marijuana        Nitesh Starr MD  06/12/25 7964